# Patient Record
Sex: FEMALE | Race: WHITE | NOT HISPANIC OR LATINO | Employment: UNEMPLOYED | URBAN - METROPOLITAN AREA
[De-identification: names, ages, dates, MRNs, and addresses within clinical notes are randomized per-mention and may not be internally consistent; named-entity substitution may affect disease eponyms.]

---

## 2017-08-08 PROBLEM — O99.320 PREGNANCY COMPLICATED BY SUBUTEX MAINTENANCE, ANTEPARTUM: Status: ACTIVE | Noted: 2017-08-08

## 2017-08-08 PROBLEM — F11.20 PREGNANCY COMPLICATED BY SUBUTEX MAINTENANCE, ANTEPARTUM: Status: ACTIVE | Noted: 2017-08-08

## 2017-12-01 PROBLEM — Z98.891 STATUS POST PRIMARY LOW TRANSVERSE CESAREAN SECTION: Status: ACTIVE | Noted: 2017-12-01

## 2018-05-16 ENCOUNTER — TELEPHONE (OUTPATIENT)
Dept: FAMILY MEDICINE | Facility: CLINIC | Age: 33
End: 2018-05-16

## 2018-05-16 NOTE — TELEPHONE ENCOUNTER
----- Message from Jordin Ceballos sent at 2018  3:27 PM CDT -----  Contact: Patient  Paulette Bryant  MRN: [id number]  : 1985  PCP: Lakshmi Lange  Home Phone      [phone]  Work Phone      Not on file.  Mobile          [phone]      MESSAGE: new patient - has been seeing Dr Cedrick Mchugh @ [facility] MD for Subutex for a little over a yr -- she has medicaid & he does not accept her insurance -- she can not afford medication @ over $200 -- will you take as a patient -- please advise    Call [phone]    PCP: Anisa

## 2018-05-18 ENCOUNTER — TELEPHONE (OUTPATIENT)
Dept: FAMILY MEDICINE | Facility: CLINIC | Age: 33
End: 2018-05-18

## 2018-05-18 NOTE — TELEPHONE ENCOUNTER
----- Message from Jordin Ceballos sent at 2018 12:19 PM CDT -----  Contact: Patient  Paulette Bryant  MRN: [id number]  : 1985  PCP: Lakshmi Lange  Home Phone      [phone]  Work Phone      Not on file.  Mobile          [phone]      MESSAGE: called earlier in the week Re: becoming new Suboxone Program - was seeing Dr Mchugh (see previous message) --no response -- please check status & advise    Call [phone]    PCP: Anisa

## 2018-05-22 NOTE — TELEPHONE ENCOUNTER
Spoke with pt, appt scheduled for 5/25/18 at 2:45.  Informed pt to bring ID, medicaid card, all rx in prescribed bottle. Pt voiced verbal understanding

## 2018-05-25 ENCOUNTER — OFFICE VISIT (OUTPATIENT)
Dept: FAMILY MEDICINE | Facility: CLINIC | Age: 33
End: 2018-05-25
Payer: MEDICAID

## 2018-05-25 ENCOUNTER — TELEPHONE (OUTPATIENT)
Dept: FAMILY MEDICINE | Facility: CLINIC | Age: 33
End: 2018-05-25

## 2018-05-25 VITALS
BODY MASS INDEX: 24.22 KG/M2 | RESPIRATION RATE: 18 BRPM | SYSTOLIC BLOOD PRESSURE: 100 MMHG | WEIGHT: 145.38 LBS | DIASTOLIC BLOOD PRESSURE: 60 MMHG | HEIGHT: 65 IN | HEART RATE: 112 BPM

## 2018-05-25 DIAGNOSIS — F11.20 OPIOID USE DISORDER, MODERATE, DEPENDENCE: Primary | ICD-10-CM

## 2018-05-25 DIAGNOSIS — K59.03 CONSTIPATION DUE TO OPIOID THERAPY: ICD-10-CM

## 2018-05-25 DIAGNOSIS — T40.2X5A CONSTIPATION DUE TO OPIOID THERAPY: ICD-10-CM

## 2018-05-25 PROCEDURE — 99204 OFFICE O/P NEW MOD 45 MIN: CPT | Mod: S$PBB,,, | Performed by: FAMILY MEDICINE

## 2018-05-25 PROCEDURE — 99999 PR PBB SHADOW E&M-EST. PATIENT-LVL III: CPT | Mod: PBBFAC,,, | Performed by: FAMILY MEDICINE

## 2018-05-25 PROCEDURE — 99213 OFFICE O/P EST LOW 20 MIN: CPT | Mod: PBBFAC | Performed by: FAMILY MEDICINE

## 2018-05-25 RX ORDER — MIRTAZAPINE 15 MG/1
TABLET, FILM COATED ORAL
Refills: 1 | COMMUNITY
Start: 2018-05-03 | End: 2019-03-20

## 2018-05-25 RX ORDER — ALPRAZOLAM 1 MG/1
1 TABLET ORAL 3 TIMES DAILY PRN
Refills: 0 | COMMUNITY
Start: 2018-04-20 | End: 2018-05-25

## 2018-05-25 RX ORDER — ONDANSETRON 8 MG/1
TABLET, ORALLY DISINTEGRATING ORAL
COMMUNITY
End: 2018-05-25

## 2018-05-25 RX ORDER — ALPRAZOLAM 0.5 MG/1
TABLET ORAL
Refills: 0 | COMMUNITY
Start: 2018-05-13 | End: 2019-03-20

## 2018-05-25 RX ORDER — LUBIPROSTONE 24 UG/1
24 CAPSULE ORAL 2 TIMES DAILY WITH MEALS
Qty: 60 CAPSULE | Refills: 5 | Status: SHIPPED | OUTPATIENT
Start: 2018-05-25 | End: 2019-03-20

## 2018-05-25 RX ORDER — BUPRENORPHINE AND NALOXONE 8; 2 MG/1; MG/1
2 FILM, SOLUBLE BUCCAL; SUBLINGUAL DAILY
Qty: 60 EACH | Refills: 0 | Status: SHIPPED | OUTPATIENT
Start: 2018-05-25 | End: 2018-06-19

## 2018-05-25 NOTE — PROGRESS NOTES
Subjective:       Patient ID: Paulette Bryant is a 33 y.o. female.    Chief Complaint: Consult (suboxone contract)    Patient here for opioid use disorder.  She became addicted to opiates 5 years ago after a root canal. She escalated her opiate use to the point that she was snorting oxycodone.  She started sebutex last year after she became pregnant.  She has been doing well.  She is going to [facility] and counseling.  Patient sees psych NP for Xanax.  She has a 5 month old and a 16 year old child.      Review of Systems   Constitutional: Negative for activity change, chills, fatigue, fever and unexpected weight change.   HENT: Negative for sore throat and trouble swallowing.    Respiratory: Negative for cough, chest tightness and shortness of breath.    Cardiovascular: Negative for chest pain and leg swelling.   Gastrointestinal: Positive for constipation. Negative for abdominal pain.   Endocrine: Negative for cold intolerance and heat intolerance.   Genitourinary: Negative for difficulty urinating.   Musculoskeletal: Negative for back pain and joint swelling.   Skin: Negative for rash.   Neurological: Negative for numbness.   Hematological: Negative for adenopathy.   Psychiatric/Behavioral: Negative for decreased concentration.       Objective:      Vitals:    05/25/18 1504   BP: 100/60   Pulse: (!) 112   Resp: 18     Physical Exam   Constitutional: She is oriented to person, place, and time. She appears well-developed and well-nourished.   Cardiovascular: Normal rate, regular rhythm, normal heart sounds and intact distal pulses.    No murmur heard.  Pulmonary/Chest: Effort normal and breath sounds normal.   Neurological: She is alert and oriented to person, place, and time. No cranial nerve deficit.   Psychiatric: Her speech is normal and behavior is normal. Judgment and thought content normal. Cognition and memory are normal. She exhibits a depressed mood.       Assessment:       1. Opioid use disorder, moderate,  dependence    2. Constipation due to opioid therapy        Plan:   Paulette was seen today for consult.    Diagnoses and all orders for this visit:    Opioid use disorder, moderate, dependence  -     buprenorphine 8 mg Film; Place 2 packets (2 each total) under the tongue once daily. THALIA # (Suboxone) [id number].  She will be changed to Suboxone as soon as I can get it authorized.    1.  A prescription for Sebutex 8 mg sublingual will be given  2.  Patient is encouraged to continue [facility] TADEC.  3.  Patient will return to clinic in one month  4.  A urinary drug screen will be done as needed.    5.  Patient encouraged to avoid places and friends that are associated with past drug  use.  6.  15 minutes was spent with patient.  At that time was counseling the patient about dependence, anxiety issues, and relationships    Constipation due to opioid therapy  -     lubiprostone (AMITIZA) 24 MCG Cap; Take 1 capsule (24 mcg total) by mouth 2 (two) times daily with meals.  Increase fiber  Continue Miralax, colace    RTC in 1 month

## 2018-05-25 NOTE — TELEPHONE ENCOUNTER
----- Message from Kathe Degroot sent at 2018  4:01 PM CDT -----  Contact: Pelon/Rod Dougherty  Paulette Bryant  MRN: [id number]  : 1985  PCP: Yonny Interiano  Home Phone      [phone]  Work Phone      Not on file.  Mobile          [phone]    MESSAGE:   Would like to speak to nurse about getting an RX substitution.  Patient was prescribed RX buprenorphine-naloxone (SUBOXONE) 8-2 mg Film, but was previously taking RX Subutex and insurance has approved Subutex already.  Would like to continue Subutex instead. Please call.  (Patient is waiting at pharmacy)    Ph[facility]acy: [facility]    Phone: [phone]

## 2018-05-25 NOTE — TELEPHONE ENCOUNTER
Spoke with Dr Blancas - Rx changed to Subutex #60 2packets sl daily.  Spoke with Pelon at [facility]

## 2018-05-25 NOTE — TELEPHONE ENCOUNTER
Pt seen today, was previously taking RX Subutex and insurance has approved Subutex already. Would like to continue Subutex instead of awaiting PA for Suboxone. Please advise, thank you.    Pharmacy: [facility].

## 2018-05-25 NOTE — PATIENT INSTRUCTIONS
Constipation (Adult)  Constipation means that you have bowel movements that are less frequent than usual. Stools often become very hard and difficult to pass.  Constipation is very common. At some point in life it affects almost everyone. Since everyone's bowel habits are different, what is constipation to one person may not be to another. Your healthcare provider may do tests to diagnose constipation. It depends on what he or she finds when evaluating you.    Symptoms of constipation include:  · Abdominal pain  · Bloating  · Vomiting  · Painful bowel movements  · Itching, swelling, bleeding, or pain around the anus  Causes  Constipation can have many causes. These include:  · Diet low in fiber  · Too much dairy  · Not drinking enough liquids  · Lack of exercise or physical activity. This is especially true for older adults.  · Changes in lifestyle or daily routine, including pregnancy, aging, work, and travel  · Frequent use or misuse of laxatives  · Ignoring the urge to have a bowel movement or delaying it until later  · Medicines, such as certain prescription pain medicines, iron supplements, antacids, certain antidepressants, and calcium supplements  · Diseases like irritable bowel syndrome, bowel obstructions, stroke, diabetes, thyroid disease, Parkinson disease, hemorrhoids, and colon cancer  Complications  Potential complications of constipation can include:  · Hemorrhoids  · Rectal bleeding from hemorrhoids or anal fissures (skin tears)  · Hernias  · Dependency on laxatives  · Chronic constipation  · Fecal impaction  · Bowel obstruction or perforation  Home care  All treatment should be done after talking with your healthcare provider. This is especially true if you have another medical problems, are taking prescription medicines, or are an older adult. Treatment most often involves lifestyle changes. You may also need medicines. Your healthcare provider will tell you which will work best for you. Follow  the advice below to help avoid this problem in the future.  Lifestyle changes  These lifestyle changes can help prevent constipation:  · Diet. Eat a high-fiber diet, with fresh fruit and vegetables, and reduce dairy intake, meats, and processed foods  · Fluids. It's important to get enough fluids each day. Drink plenty of water when you eat more fiber. If you are on diet that limits the amount of fluid you can have, talk about this with your healthcare provider.  · Regular exercise. Check with your healthcare provider first.  Medications  Take any medicines as directed. Some laxatives are safe to use only every now and then. Others can be taken on a regular basis. Talk with your doctor or pharmacist if you have questions.  Prescription pain medicines can cause constipation. If you are taking this kind of medicine, ask your healthcare provider if you should also take a stool softener.  Medicines you may take to treat constipation include:  · Fiber supplements  · Stool softeners  · Laxatives  · Enemas  · Rectal suppositories  Follow-up care  Follow up with your healthcare provider if symptoms don't get better in the next few days. You may need to have more tests or see a specialist.  Call 911  Call 911 if any of these occur:  · Trouble breathing  · Stiff, rigid abdomen that is severely painful to touch  · Confusion  · Fainting or loss of consciousness  · Rapid heart rate  · Chest pain  When to seek medical advice  Call your healthcare provider right away if any of these occur:  · Fever over 100.4°F (38°C)  · Failure to resume normal bowel movements  · Pain in your abdomen or back gets worse  · Nausea or vomiting  · Swelling in your abdomen  · Blood in the stool  · Black, tarry stool  · Involuntary weight loss  · Weakness  Date Last Reviewed: 12/30/2015  © 1733-6890 [employer]. [address] [place] [address], [place] [address]. All rights reserved. This information is not intended as a substitute for  professional medical care. Always follow your healthcare professional's instructions.

## 2018-07-13 ENCOUNTER — OFFICE VISIT (OUTPATIENT)
Dept: FAMILY MEDICINE | Facility: CLINIC | Age: 33
End: 2018-07-13
Payer: MEDICAID

## 2018-07-13 VITALS
HEART RATE: 124 BPM | SYSTOLIC BLOOD PRESSURE: 114 MMHG | BODY MASS INDEX: 22.69 KG/M2 | WEIGHT: 136.19 LBS | DIASTOLIC BLOOD PRESSURE: 64 MMHG | RESPIRATION RATE: 18 BRPM | HEIGHT: 65 IN

## 2018-07-13 DIAGNOSIS — T40.2X5A CONSTIPATION DUE TO OPIOID THERAPY: ICD-10-CM

## 2018-07-13 DIAGNOSIS — K59.03 CONSTIPATION DUE TO OPIOID THERAPY: ICD-10-CM

## 2018-07-13 DIAGNOSIS — F11.20 OPIOID USE DISORDER, SEVERE, DEPENDENCE: Primary | ICD-10-CM

## 2018-07-13 PROCEDURE — 99999 PR PBB SHADOW E&M-EST. PATIENT-LVL III: CPT | Mod: PBBFAC,,, | Performed by: FAMILY MEDICINE

## 2018-07-13 PROCEDURE — 99213 OFFICE O/P EST LOW 20 MIN: CPT | Mod: PBBFAC | Performed by: FAMILY MEDICINE

## 2018-07-13 PROCEDURE — 99214 OFFICE O/P EST MOD 30 MIN: CPT | Mod: S$PBB,,, | Performed by: FAMILY MEDICINE

## 2018-07-13 RX ORDER — BUPRENORPHINE HYDROCHLORIDE 8 MG/1
16 TABLET SUBLINGUAL DAILY
Qty: 60 TABLET | Refills: 0 | Status: SHIPPED | OUTPATIENT
Start: 2018-07-13 | End: 2019-03-20 | Stop reason: SDUPTHER

## 2018-07-13 RX ORDER — SERTRALINE HYDROCHLORIDE 50 MG/1
50 TABLET, FILM COATED ORAL DAILY
COMMUNITY
End: 2019-03-20

## 2018-07-13 NOTE — PROGRESS NOTES
Subjective:       Patient ID: Paulette Bryant is a 33 y.o. female.    Chief Complaint: Follow-up (4 week follow up)    Patient here for opioid use disorder.  She became addicted to opiates 5 years ago after a root canal. She escalated her opiate use to the point that she was snorting oxycodone.  She started sebutex last year after she became pregnant.  She has been doing well.  [facility]  Patient sees psych NP for Xanax.  She has a 5 month old and a 16 year old child.  She was stable on Sebutex 16 mg daily.  She stopped it 10 days ago and now in full blown withdrawal.  She began taking oxycodone to relieve withdraw symptoms.  She is very nauseated.  She suffers with opioid induced constipation.  Amitiza has been helpful.      Review of Systems   Constitutional: Negative for activity change, chills, fatigue, fever and unexpected weight change.   HENT: Negative for sore throat and trouble swallowing.    Respiratory: Negative for cough, chest tightness and shortness of breath.    Cardiovascular: Negative for chest pain and leg swelling.   Gastrointestinal: Positive for constipation. Negative for abdominal pain.   Endocrine: Negative for cold intolerance and heat intolerance.   Genitourinary: Negative for difficulty urinating.   Musculoskeletal: Negative for back pain and joint swelling.   Skin: Negative for rash.   Neurological: Negative for numbness.   Hematological: Negative for adenopathy.   Psychiatric/Behavioral: Negative for decreased concentration.       Objective:      Vitals:    07/13/18 1407   BP: 114/64   Pulse: (!) 124   Resp: 18     Physical Exam   Constitutional: She is oriented to person, place, and time. She appears well-developed and well-nourished. She appears distressed.   She is an obvious opioid withdrawal   Cardiovascular: Regular rhythm, S1 normal, S2 normal, normal heart sounds and intact distal pulses.  Tachycardia present.  Exam reveals no gallop and no friction  rub.    No murmur heard.  Pulmonary/Chest: Effort normal and breath sounds normal.   Neurological: She is alert and oriented to person, place, and time. No cranial nerve deficit.   Skin: She is diaphoretic.   Psychiatric: Her speech is normal and behavior is normal. Judgment and thought content normal. Cognition and memory are normal. She exhibits a depressed mood.         Assessment:       1. Constipation due to opioid therapy        Plan:   Paulette was seen today for consult.    Diagnoses and all orders for this visit:    Opioid use disorder, now in withdrawal  1.  Restart buprenorphine 8 mg Film; Place 2 packets (2 each total) under the tongue once daily.   2.  Patient is encouraged to continue [place] TADEC.  3.  Patient will return to clinic in one month  4.  A urinary drug screen will be done as needed.    5.  Patient encouraged to avoid places and friends that are associated with past drug  use.  6.  15 minutes was spent with patient.  At that time was counseling the patient about dependence, anxiety issues, and relationships     I will re-evaluate patient in 2 weeks and will start weaning her very slowly.    Constipation due to opioid therapy  AMITIZA 24 MCG Cap; Take 1 capsule (24 mcg total) by mouth 2 (two) times daily with meals.  Increase fiber  Continue Miralax, colace    RTC in 2 weeks

## 2018-07-25 ENCOUNTER — TELEPHONE (OUTPATIENT)
Dept: FAMILY MEDICINE | Facility: CLINIC | Age: 33
End: 2018-07-25

## 2018-07-25 NOTE — TELEPHONE ENCOUNTER
----- Message from Jordin Ceballos sent at 2018  2:54 PM CDT -----  Contact: Patient  Paulette Bryant  MRN: [id number]  : 1985  PCP: Yonny Interiano  Home Phone      [phone]  Work Phone      Not on file.  Mobile          Not on file.      MESSAGE: asking to speak with Dr Blancas's nurse Re: appt scheduled for Friday    Call [phone]    PCP: Jaydon

## 2019-03-20 ENCOUNTER — OFFICE VISIT (OUTPATIENT)
Dept: FAMILY MEDICINE | Facility: CLINIC | Age: 34
End: 2019-03-20
Payer: MEDICAID

## 2019-03-20 VITALS
BODY MASS INDEX: 19.72 KG/M2 | RESPIRATION RATE: 18 BRPM | HEART RATE: 80 BPM | SYSTOLIC BLOOD PRESSURE: 88 MMHG | DIASTOLIC BLOOD PRESSURE: 56 MMHG | WEIGHT: 118.38 LBS | HEIGHT: 65 IN

## 2019-03-20 DIAGNOSIS — F11.20 OPIOID USE DISORDER, SEVERE, DEPENDENCE: ICD-10-CM

## 2019-03-20 PROCEDURE — 99214 PR OFFICE/OUTPT VISIT, EST, LEVL IV, 30-39 MIN: ICD-10-PCS | Mod: S$PBB,,, | Performed by: FAMILY MEDICINE

## 2019-03-20 PROCEDURE — 99999 PR PBB SHADOW E&M-EST. PATIENT-LVL IV: ICD-10-PCS | Mod: PBBFAC,,, | Performed by: FAMILY MEDICINE

## 2019-03-20 PROCEDURE — 99214 OFFICE O/P EST MOD 30 MIN: CPT | Mod: PBBFAC | Performed by: FAMILY MEDICINE

## 2019-03-20 PROCEDURE — 99999 PR PBB SHADOW E&M-EST. PATIENT-LVL IV: CPT | Mod: PBBFAC,,, | Performed by: FAMILY MEDICINE

## 2019-03-20 PROCEDURE — 99214 OFFICE O/P EST MOD 30 MIN: CPT | Mod: S$PBB,,, | Performed by: FAMILY MEDICINE

## 2019-03-20 RX ORDER — PREDNISONE 20 MG/1
20 TABLET ORAL DAILY
Refills: 0 | COMMUNITY
Start: 2019-03-15 | End: 2019-06-26 | Stop reason: ALTCHOICE

## 2019-03-20 RX ORDER — BUPRENORPHINE HYDROCHLORIDE 8 MG/1
16 TABLET SUBLINGUAL DAILY
Qty: 60 TABLET | Refills: 0 | Status: SHIPPED | OUTPATIENT
Start: 2019-03-20 | End: 2019-04-15 | Stop reason: SDUPTHER

## 2019-03-20 RX ORDER — AMOXICILLIN 875 MG/1
TABLET, FILM COATED ORAL
Refills: 0 | COMMUNITY
Start: 2019-03-15 | End: 2019-06-26 | Stop reason: ALTCHOICE

## 2019-03-20 NOTE — PROGRESS NOTES
Subjective:       Patient ID: Paulette Bryant is a 33 y.o. female.    Chief Complaint: IV Medication (Subutex)    Patient in middle of divorce.  Now using 3 percocet per day.  She tried to stop but developed severe withdrawals.  She borrowed a friend's Subutex which was very helpful.  She feels like she needs to get back on Subutex.  She is currently seeing a counselor for her psych issues.  She became addicted to opiates 5 years ago after a root canal. She escalated her opiate use to the point that she was snorting oxycodone.  She started sebutex last year after she became pregnant.  She has been doing well.  She is going to [facility] and counseling.  Patient sees psych NP for Xanax.  She has a 5 month old and a 16 year old child.    She suffers with opioid induced constipation.  Amitiza has been helpful.      Review of Systems   Constitutional: Negative for activity change, chills, fatigue, fever and unexpected weight change.   HENT: Negative for sore throat and trouble swallowing.    Respiratory: Negative for cough, chest tightness and shortness of breath.    Cardiovascular: Negative for chest pain and leg swelling.   Gastrointestinal: Positive for constipation. Negative for abdominal pain.   Endocrine: Negative for cold intolerance and heat intolerance.   Genitourinary: Negative for difficulty urinating.   Musculoskeletal: Negative for back pain and joint swelling.   Skin: Negative for rash.   Neurological: Negative for numbness.   Hematological: Negative for adenopathy.   Psychiatric/Behavioral: Negative for decreased concentration.       Objective:      Vitals:    03/20/19 0934   BP: (!) 88/56   Pulse: 80   Resp: 18     Physical Exam   Constitutional: She is oriented to person, place, and time. She appears well-developed and well-nourished. She appears distressed.   She is an obvious opioid withdrawal   Cardiovascular: Regular rhythm, S1 normal, S2 normal, normal heart sounds and intact distal pulses. Tachycardia  present. Exam reveals no gallop and no friction rub.   No murmur heard.  Pulmonary/Chest: Effort normal and breath sounds normal.   Neurological: She is alert and oriented to person, place, and time. No cranial nerve deficit.   Skin: She is diaphoretic.   Psychiatric: Her speech is normal and behavior is normal. Judgment and thought content normal. Cognition and memory are normal. She exhibits a depressed mood.         Assessment:       1. Opioid use disorder, severe, dependence        Plan:   Paulette was seen today for consult.    Diagnoses and all orders for this visit:    Opioid use disorder, now in withdrawal  1.  Restart buprenorphine 8 mg Film; Place 2 packets (2 each total) under the tongue once daily.   2.  Patient is encouraged to continue [facility] TADEC.  3.  Patient will return to clinic in one month  4.  A urinary drug screen will be done as needed.    5.  Patient encouraged to avoid places and friends that are associated with past drug  use.  6.  15 minutes was spent with patient.  At that time was counseling the patient about dependence, anxiety issues, and relationships    Constipation due to opioid therapy  AMITIZA 24 MCG Cap; Take 1 capsule (24 mcg total) by mouth 2 (two) times daily with meals.  Increase fiber  Continue Miralax, colace    RTC in 2 weeks

## 2019-04-15 ENCOUNTER — TELEPHONE (OUTPATIENT)
Dept: FAMILY MEDICINE | Facility: CLINIC | Age: 34
End: 2019-04-15

## 2019-04-15 DIAGNOSIS — F11.20 OPIOID USE DISORDER, SEVERE, DEPENDENCE: ICD-10-CM

## 2019-04-15 RX ORDER — BUPRENORPHINE HYDROCHLORIDE 8 MG/1
16 TABLET SUBLINGUAL DAILY
Qty: 60 TABLET | Refills: 0 | Status: SHIPPED | OUTPATIENT
Start: 2019-04-15 | End: 2019-06-26 | Stop reason: SDUPTHER

## 2019-04-15 NOTE — TELEPHONE ENCOUNTER
"LOV and rx last printed on 3/20/19  Spoke with April at [facility], medication buprenorphine HCl (SUBUTEX) 8 mg Subl.   The patient states she is 12 weeks pregnant. Not sure if this medication and dosage is safe for pregnancy.     Pt sees Dr Lawrence Greene OB/GYN: he may be calling you to see if this dose is ok.         NASIR [place]   "race">White "sex">Female  "cell_block"    Date">04/14/2019  "held_for_agency">[facility] POLICE       CHARGE: 40:967 C (1) - POSSESSION OF OXYCODONE; Arrest Date 04/14/2019; [facility] - [facility]; Set By JUDGE JOHNATHAN BERMUDEZ;<BR>ONSITE     CHARGE: 40:966 - POSSESSION OF MARIJUANA 1ST  OFFENSE (MISD); Arrest Date 04/14/2019; [facility] - [facility]; Set By JUDGE JOHNATHAN BERMUDEZ;<BR>ONSITE     CHARGE: 40:1023 - PROHIBITED ACTS/ POSSESSION OF DRUG PARAPHERNALIA; Arrest Date 04/14/2019; [facility] - [facility], $5000.00; Set By JUDGE JOHNATHAN BERMUDEZ;<BR>ONSITE     CHARGE: 14:98.1 - DWI 1ST; Arrest Date 04/14/2019; [facility] - [facility], $500.00; Set By JUDGE JOHNATHAN BERMUDEZ;  "

## 2019-04-15 NOTE — TELEPHONE ENCOUNTER
Spoke with April at [facility] [place], she said pt maybe getting out today- if not April will call back

## 2019-04-15 NOTE — TELEPHONE ENCOUNTER
Needs to stay at current dose    Opioid use disorder, severe, dependence  -     buprenorphine HCl (SUBUTEX) 8 mg Subl; Place 2 tablets (16 mg total) under the tongue once daily. THALIA # (Suboxone) [id number]  Dispense: 60 tablet; Refill: 0

## 2019-04-15 NOTE — TELEPHONE ENCOUNTER
----- Message from Roxy Robledo sent at 4/15/2019  9:34 AM CDT -----  Contact: April - stella Bryant  MRN: [id number]  : 1985  PCP: Yonny Interiano  Home Phone      [phone]  Work Phone      Not on file.  Mobile          [phone]      MESSAGE:    April needs to speak to a nurse about the patients medication buprenorphine HCl (SUBUTEX) 8 mg Subl. The patient states she is 12 weeks pregnant. Not sure if this medication and dosage is safe for pregnancy.     [phone]

## 2019-06-24 ENCOUNTER — TELEPHONE (OUTPATIENT)
Dept: FAMILY MEDICINE | Facility: CLINIC | Age: 34
End: 2019-06-24

## 2019-06-24 NOTE — TELEPHONE ENCOUNTER
----- Message from Cynthia Cantu sent at 2019  9:41 AM CDT -----  Contact: self  Paulette Dupont  MRN: [id number]  : 1985  PCP: Yonny Interiano  Home Phone      [phone]  Work Phone      [phone]  Mobile          [phone]      MESSAGE:   Pt requests a sooner appointment than the  can schedule.  Does patient feel like they need to be seen today:  yes  What is the nature of the appointment:  Discuss medication   What visit type:  est  Did you check other providers/department schedules for availability:   Only yashs yonny  Comments:     Phone:  [phone]

## 2019-06-26 ENCOUNTER — OFFICE VISIT (OUTPATIENT)
Dept: FAMILY MEDICINE | Facility: CLINIC | Age: 34
End: 2019-06-26
Payer: MEDICAID

## 2019-06-26 VITALS
WEIGHT: 122 LBS | BODY MASS INDEX: 20.33 KG/M2 | DIASTOLIC BLOOD PRESSURE: 80 MMHG | SYSTOLIC BLOOD PRESSURE: 122 MMHG | HEIGHT: 65 IN | RESPIRATION RATE: 16 BRPM | HEART RATE: 80 BPM

## 2019-06-26 DIAGNOSIS — F11.20 OPIOID USE DISORDER, SEVERE, DEPENDENCE: ICD-10-CM

## 2019-06-26 PROCEDURE — 99214 PR OFFICE/OUTPT VISIT, EST, LEVL IV, 30-39 MIN: ICD-10-PCS | Mod: S$PBB,,, | Performed by: FAMILY MEDICINE

## 2019-06-26 PROCEDURE — 99999 PR PBB SHADOW E&M-EST. PATIENT-LVL III: ICD-10-PCS | Mod: PBBFAC,,, | Performed by: FAMILY MEDICINE

## 2019-06-26 PROCEDURE — 99999 PR PBB SHADOW E&M-EST. PATIENT-LVL III: CPT | Mod: PBBFAC,,, | Performed by: FAMILY MEDICINE

## 2019-06-26 PROCEDURE — 99213 OFFICE O/P EST LOW 20 MIN: CPT | Mod: PBBFAC | Performed by: FAMILY MEDICINE

## 2019-06-26 PROCEDURE — 99214 OFFICE O/P EST MOD 30 MIN: CPT | Mod: S$PBB,,, | Performed by: FAMILY MEDICINE

## 2019-06-26 RX ORDER — BUPRENORPHINE HYDROCHLORIDE 8 MG/1
16 TABLET SUBLINGUAL DAILY
Qty: 60 TABLET | Refills: 0 | Status: SHIPPED | OUTPATIENT
Start: 2019-06-26 | End: 2019-07-26

## 2019-06-26 NOTE — PROGRESS NOTES
Subjective:       Patient ID: Paulette Dupont is a 34 y.o. female.    Chief Complaint: discuss medications    Patient in middle of divorce.  Now using 3 percocet per day.  She tried to stop but developed severe withdrawals.  She borrowed a friend's Subutex which was very helpful.  She feels like she needs to get back on Subutex.  She is currently seeing a counselor for her psych issues.  She became addicted to opiates 5 years ago after a root canal. She escalated her opiate use to the point that she was snorting oxycodone.  She started sebutex last year after she became pregnant.  She has been doing well.  She is going to [facility] and counseling.  Patient sees psych NP for Xanax.  She has a 5 month old and a 16 year old child.    She suffers with opioid induced constipation.  Amitiza has been helpful.    Review of Systems   Constitutional: Negative for activity change, chills, fatigue, fever and unexpected weight change.   HENT: Negative for sore throat and trouble swallowing.    Respiratory: Negative for cough, chest tightness and shortness of breath.    Cardiovascular: Negative for chest pain and leg swelling.   Gastrointestinal: Positive for constipation. Negative for abdominal pain.   Endocrine: Negative for cold intolerance and heat intolerance.   Genitourinary: Negative for difficulty urinating.   Musculoskeletal: Negative for back pain and joint swelling.   Skin: Negative for rash.   Neurological: Negative for numbness.   Hematological: Negative for adenopathy.   Psychiatric/Behavioral: Negative for decreased concentration.       Objective:      Vitals:    06/26/19 1618   BP: 122/80   Pulse: 80   Resp: 16     Physical Exam   Constitutional: She is oriented to person, place, and time. She appears well-developed and well-nourished. She appears distressed.   She is an obvious opioid withdrawal   Cardiovascular: Regular rhythm, S1 normal, S2 normal, normal heart sounds and intact distal pulses. Tachycardia present.  Exam reveals no gallop and no friction rub.   No murmur heard.  Pulmonary/Chest: Effort normal and breath sounds normal.   Neurological: She is alert and oriented to person, place, and time. No cranial nerve deficit.   Skin: She is diaphoretic.   Psychiatric: Her speech is normal and behavior is normal. Judgment and thought content normal. Cognition and memory are normal. She exhibits a depressed mood.         Assessment:       1. Opioid use disorder, severe, dependence        Plan:   Paulette was seen today for consult.    Diagnoses and all orders for this visit:    Opioid use disorder, now in withdrawal  Opioid use disorder, severe, dependence  -     buprenorphine HCl (SUBUTEX) 8 mg Subl; Place 2 tablets (16 mg total) under the tongue once daily. THALIA # (Suboxone) [id number]  Dispense: 60 tablet; Refill: 0    1.  Restart buprenorphine 8 mg Film; Place 2 packets (2 each total) under the tongue once daily.   2.  Patient is encouraged to continue [facility] TADEC.  3.  Patient will return to clinic in one month  4.  A urinary drug screen will be done as needed.    5.  Patient encouraged to avoid places and friends that are associated with past drug  use.  6.  15 minutes was spent with patient.  At that time was counseling the patient about dependence, anxiety issues, and relationships    Constipation due to opioid therapy  AMITIZA 24 MCG Cap; Take 1 capsule (24 mcg total) by mouth 2 (two) times daily with meals.  Increase fiber  Continue Miralax, colace    RTC in 2 weeks

## 2019-08-19 RX ORDER — BUPRENORPHINE HYDROCHLORIDE 8 MG/1
16 TABLET SUBLINGUAL DAILY
COMMUNITY
End: 2019-08-19 | Stop reason: SDUPTHER

## 2019-08-19 NOTE — TELEPHONE ENCOUNTER
----- Message from Cynthia Cantu sent at 2019 12:59 PM CDT -----  Contact: self  Paulette Dupont  MRN: [id number]  : 1985  PCP: Yonny Interiano  Home Phone      [phone]  Work Phone      [phone]  Mobile          [phone]      MESSAGE:   Pt requesting refill or new Rx.   Is this a refill or new RX:  refill  RX name and strength: buprenorphine HCl (SUBUTEX) 8 mg Subl  Last office visit: 19  Is this a 30-day or 90-day RX:  30  Pharmacy name and location:  [facility]  Comments:      Phone:  [phone]

## 2019-08-19 NOTE — TELEPHONE ENCOUNTER
LOV 6/26/19, rtc 2 weeks- pt no showed for appt    subutex last printed on 6/26/19    She has appt on 8/21/19, she needs enough until appt  Thank you

## 2019-08-21 RX ORDER — BUPRENORPHINE HYDROCHLORIDE 8 MG/1
16 TABLET SUBLINGUAL DAILY
Qty: 4 TABLET | Refills: 0 | Status: SHIPPED | OUTPATIENT
Start: 2019-08-21 | End: 2019-10-25 | Stop reason: DRUGHIGH

## 2019-10-17 PROBLEM — O34.219 VBAC (VAGINAL BIRTH AFTER CESAREAN): Status: ACTIVE | Noted: 2019-10-17

## 2019-10-19 PROBLEM — O34.219 VBAC (VAGINAL BIRTH AFTER CESAREAN): Status: RESOLVED | Noted: 2019-10-17 | Resolved: 2019-10-19

## 2019-10-25 ENCOUNTER — OFFICE VISIT (OUTPATIENT)
Dept: FAMILY MEDICINE | Facility: CLINIC | Age: 34
End: 2019-10-25
Payer: COMMERCIAL

## 2019-10-25 VITALS
RESPIRATION RATE: 18 BRPM | SYSTOLIC BLOOD PRESSURE: 138 MMHG | HEIGHT: 65 IN | DIASTOLIC BLOOD PRESSURE: 92 MMHG | HEART RATE: 86 BPM | WEIGHT: 126 LBS | BODY MASS INDEX: 20.99 KG/M2

## 2019-10-25 DIAGNOSIS — F11.20 OPIOID USE DISORDER, SEVERE, DEPENDENCE: Primary | ICD-10-CM

## 2019-10-25 PROCEDURE — 99214 OFFICE O/P EST MOD 30 MIN: CPT | Mod: S$GLB,,, | Performed by: FAMILY MEDICINE

## 2019-10-25 PROCEDURE — 99999 PR PBB SHADOW E&M-EST. PATIENT-LVL III: CPT | Mod: PBBFAC,,, | Performed by: FAMILY MEDICINE

## 2019-10-25 PROCEDURE — 99214 PR OFFICE/OUTPT VISIT, EST, LEVL IV, 30-39 MIN: ICD-10-PCS | Mod: S$GLB,,, | Performed by: FAMILY MEDICINE

## 2019-10-25 PROCEDURE — 3008F PR BODY MASS INDEX (BMI) DOCUMENTED: ICD-10-PCS | Mod: CPTII,S$GLB,, | Performed by: FAMILY MEDICINE

## 2019-10-25 PROCEDURE — 99999 PR PBB SHADOW E&M-EST. PATIENT-LVL III: ICD-10-PCS | Mod: PBBFAC,,, | Performed by: FAMILY MEDICINE

## 2019-10-25 PROCEDURE — 3008F BODY MASS INDEX DOCD: CPT | Mod: CPTII,S$GLB,, | Performed by: FAMILY MEDICINE

## 2019-10-25 RX ORDER — BUPRENORPHINE AND NALOXONE 8; 2 MG/1; MG/1
2 FILM, SOLUBLE BUCCAL; SUBLINGUAL DAILY
Qty: 60 EACH | Refills: 0 | Status: SHIPPED | OUTPATIENT
Start: 2019-10-25 | End: 2019-12-11 | Stop reason: SDUPTHER

## 2019-10-25 RX ORDER — BUPRENORPHINE HYDROCHLORIDE 8 MG/1
16 TABLET SUBLINGUAL DAILY
Qty: 4 TABLET | Refills: 0 | Status: CANCELLED | OUTPATIENT
Start: 2019-10-25

## 2019-10-25 NOTE — PROGRESS NOTES
Subjective:       Patient ID: Paulette Dupont is a 34 y.o. female.    Chief Complaint: Medication Refill    Patient in middle of divorce.  Now using 3 percocet per day.  She tried to stop but developed severe withdrawals.  She borrowed a friend's Subutex which was very helpful.  She feels like she needs to get back on Subutex.  She is currently seeing a counselor for her psych issues.  She became addicted to opiates 5 years ago after a root canal. She escalated her opiate use to the point that she was snorting oxycodone.  She started sebutex last year after she became pregnant.  She has been doing well.  She is going to [facility] and counseling.  Patient sees psych NP for Xanax.  She has a 5 month old and a 16 year old child.    She suffers with opioid induced constipation.  Amitiza has been helpful.    Medication Refill   Pertinent negatives include no abdominal pain, chest pain, chills, coughing, fatigue, fever, joint swelling, numbness, rash or sore throat.     Review of Systems   Constitutional: Negative for activity change, chills, fatigue, fever and unexpected weight change.   HENT: Negative for sore throat and trouble swallowing.    Respiratory: Negative for cough, chest tightness and shortness of breath.    Cardiovascular: Negative for chest pain and leg swelling.   Gastrointestinal: Positive for constipation. Negative for abdominal pain.   Endocrine: Negative for cold intolerance and heat intolerance.   Genitourinary: Negative for difficulty urinating.   Musculoskeletal: Negative for back pain and joint swelling.   Skin: Negative for rash.   Neurological: Negative for numbness.   Hematological: Negative for adenopathy.   Psychiatric/Behavioral: Negative for decreased concentration.       Objective:      Vitals:    10/25/19 1028   BP: (!) 138/92   Pulse: 86   Resp: 18     Physical Exam   Constitutional: She is oriented to person, place, and time. She appears well-developed and well-nourished. She appears  distressed.   She is an obvious opioid withdrawal   Cardiovascular: Regular rhythm, S1 normal, S2 normal, normal heart sounds and intact distal pulses. Tachycardia present. Exam reveals no gallop and no friction rub.   No murmur heard.  Pulmonary/Chest: Effort normal and breath sounds normal.   Neurological: She is alert and oriented to person, place, and time. No cranial nerve deficit.   Skin: She is diaphoretic.   Psychiatric: Her speech is normal and behavior is normal. Judgment and thought content normal. Cognition and memory are normal. She exhibits a depressed mood.         Assessment:       1. Opioid use disorder, severe, dependence        Plan:   Paulette was seen today for consult.    Diagnoses and all orders for this visit:    Opioid use disorder, now in withdrawal  Opioid use disorder, severe, dependence  -     buprenorphine-naloxone (SUBOXONE) 8-2 mg Film; Place 2 packets (2 each total) under the tongue once daily. THALIA # (Suboxone) [id number]  Dispense: 60 each; Refill: 0    Other orders  -     Cancel: buprenorphine HCl (SUBUTEX) 8 mg Subl; Place 2 tablets (16 mg total) under the tongue once daily.  Dispense: 4 tablet; Refill: 0    1.  Restart buprenorphine 8 mg Film; Place 2 packets (2 each total) under the tongue once daily.   2.  Patient is encouraged to continue [facility].  3.  Patient will return to clinic in one month  4.  A urinary drug screen will be done as needed.    5.  Patient encouraged to avoid places and friends that are associated with past drug  use.  6.  15 minutes was spent with patient.  At that time was counseling the patient about dependence, anxiety issues, and relationships    Constipation due to opioid therapy  AMITIZA 24 MCG Cap; Take 1 capsule (24 mcg total) by mouth 2 (two) times daily with meals.  Increase fiber  Continue Miralax, colace    RTC in 4 weeks

## 2019-12-11 ENCOUNTER — OFFICE VISIT (OUTPATIENT)
Dept: FAMILY MEDICINE | Facility: CLINIC | Age: 34
End: 2019-12-11
Payer: COMMERCIAL

## 2019-12-11 VITALS
HEART RATE: 112 BPM | HEIGHT: 65 IN | DIASTOLIC BLOOD PRESSURE: 60 MMHG | RESPIRATION RATE: 16 BRPM | WEIGHT: 119.06 LBS | SYSTOLIC BLOOD PRESSURE: 132 MMHG | BODY MASS INDEX: 19.83 KG/M2

## 2019-12-11 DIAGNOSIS — F11.20 OPIOID USE DISORDER, SEVERE, DEPENDENCE: ICD-10-CM

## 2019-12-11 PROCEDURE — 99999 PR PBB SHADOW E&M-EST. PATIENT-LVL III: CPT | Mod: PBBFAC,,, | Performed by: FAMILY MEDICINE

## 2019-12-11 PROCEDURE — 99999 PR PBB SHADOW E&M-EST. PATIENT-LVL III: ICD-10-PCS | Mod: PBBFAC,,, | Performed by: FAMILY MEDICINE

## 2019-12-11 PROCEDURE — 99213 PR OFFICE/OUTPT VISIT, EST, LEVL III, 20-29 MIN: ICD-10-PCS | Mod: S$GLB,,, | Performed by: FAMILY MEDICINE

## 2019-12-11 PROCEDURE — 3008F PR BODY MASS INDEX (BMI) DOCUMENTED: ICD-10-PCS | Mod: CPTII,S$GLB,, | Performed by: FAMILY MEDICINE

## 2019-12-11 PROCEDURE — 3008F BODY MASS INDEX DOCD: CPT | Mod: CPTII,S$GLB,, | Performed by: FAMILY MEDICINE

## 2019-12-11 PROCEDURE — 99213 OFFICE O/P EST LOW 20 MIN: CPT | Mod: S$GLB,,, | Performed by: FAMILY MEDICINE

## 2019-12-11 RX ORDER — CLONAZEPAM 2 MG/1
TABLET ORAL
Refills: 0 | COMMUNITY
Start: 2019-11-11

## 2019-12-11 RX ORDER — TRAZODONE HYDROCHLORIDE 50 MG/1
50 TABLET ORAL DAILY
Refills: 5 | COMMUNITY
Start: 2019-10-25

## 2019-12-11 RX ORDER — BUPRENORPHINE AND NALOXONE 8; 2 MG/1; MG/1
2 FILM, SOLUBLE BUCCAL; SUBLINGUAL DAILY
Qty: 60 EACH | Refills: 0 | Status: SHIPPED | OUTPATIENT
Start: 2019-12-11 | End: 2020-01-10

## 2019-12-11 NOTE — PROGRESS NOTES
Subjective:       Patient ID: Paulette Dupont is a 34 y.o. female.    Chief Complaint: Follow-up (check up, ran out of meds) and Back Pain (mid-back pain radiates to R shoulder)    Patient here for Suboxone.  She takes 2 per day.  She missed her appointment 2 weeks ago and ran out.  Since then she has been stretching her Suboxone.  She took meth as well as THC to deal with withdrawal symptoms.  It took a little bit of interviewing to get this patient to admit to this.  Patient was in the middle of divorce.  She was using 3 percocet per day.  She tried to stop but developed severe withdrawals.  She borrowed a friend's Subutex which was very helpful.  She feels like she needs to get back on Subutex.  She is currently seeing a counselor for her psych issues.  She became addicted to opiates 5 years ago after a root canal. She escalated her opiate use to the point that she was snorting oxycodone.  She started sebutex last year after she became pregnant.  She has been doing well.  She is going to counseling and home a wheezing on a.  She has not given me proof..  Patient sees psych NP for Xanax.  She has a 5 month old and a 16 year old child.    She suffers with opioid induced constipation.  Amitiza has been helpful.    Medication Refill   Pertinent negatives include no abdominal pain, chest pain, chills, coughing, fatigue, fever, joint swelling, numbness, rash or sore throat.   Follow-up   Pertinent negatives include no abdominal pain, chest pain, chills, coughing, fatigue, fever, joint swelling, numbness, rash or sore throat.   Back Pain   Pertinent negatives include no abdominal pain, chest pain, fever or numbness.     Review of Systems   Constitutional: Negative for activity change, chills, fatigue, fever and unexpected weight change.   HENT: Negative for sore throat and trouble swallowing.    Respiratory: Negative for cough, chest tightness and shortness of breath.    Cardiovascular: Negative for chest pain and leg  swelling.   Gastrointestinal: Positive for constipation. Negative for abdominal pain.   Endocrine: Negative for cold intolerance and heat intolerance.   Genitourinary: Negative for difficulty urinating.   Musculoskeletal: Positive for back pain. Negative for joint swelling.   Skin: Negative for rash.   Neurological: Negative for numbness.   Hematological: Negative for adenopathy.   Psychiatric/Behavioral: Negative for decreased concentration.       Objective:      Vitals:    12/11/19 1525   BP: 132/60   Pulse: (!) 112   Resp: 16     Physical Exam   Constitutional: She is oriented to person, place, and time. She appears well-developed and well-nourished. She appears distressed.   She is an obvious opioid withdrawal   Cardiovascular: Regular rhythm, S1 normal, S2 normal, normal heart sounds and intact distal pulses. Tachycardia present. Exam reveals no gallop and no friction rub.   No murmur heard.  Pulmonary/Chest: Effort normal and breath sounds normal.   Neurological: She is alert and oriented to person, place, and time. No cranial nerve deficit.   Skin: She is diaphoretic.   Psychiatric: Her speech is normal and behavior is normal. Judgment and thought content normal. Cognition and memory are normal. She exhibits a depressed mood.         Assessment:       1. Opioid use disorder, severe, dependence        Plan:   Paulette was seen today for consult.    Diagnoses and all orders for this visit:    Recent THC and Meth use    Opioid use disorder, severe, dependence  -     buprenorphine-naloxone (SUBOXONE) 8-2 mg Film; Place 2 packets (2 each total) under the tongue once daily. THALIA # (Suboxone) [id number]  Dispense: 60 each; Refill: 0    1.  Restart buprenorphine 8 mg Film; Place 2 packets (2 each total) under the tongue once daily.   2.  Patient is encouraged to continue [facility] T[facility].  3.  Patient will return to clinic in one month  4.  A urinary drug screen will be done as needed.    5.  Patient encouraged to avoid  places and friends that are associated with past drug  use.  6.  15 minutes was spent with patient.  At that time was counseling the patient about dependence, anxiety issues, and relationships    Constipation due to opioid therapy  AMITIZA 24 MCG Cap; Take 1 capsule (24 mcg total) by mouth 2 (two) times daily with meals.  Increase fiber  Continue Miralax, colace    RTC in 3 weeks for urine drug screen.  Must make this appointment.

## 2020-08-21 DIAGNOSIS — Z11.59 NEED FOR HEPATITIS C SCREENING TEST: ICD-10-CM

## 2020-11-07 ENCOUNTER — PATIENT MESSAGE (OUTPATIENT)
Dept: FAMILY MEDICINE | Facility: CLINIC | Age: 35
End: 2020-11-07

## 2020-11-10 ENCOUNTER — PATIENT MESSAGE (OUTPATIENT)
Dept: FAMILY MEDICINE | Facility: CLINIC | Age: 35
End: 2020-11-10

## 2021-01-04 ENCOUNTER — PATIENT MESSAGE (OUTPATIENT)
Dept: ADMINISTRATIVE | Facility: HOSPITAL | Age: 36
End: 2021-01-04

## 2021-04-05 ENCOUNTER — PATIENT MESSAGE (OUTPATIENT)
Dept: ADMINISTRATIVE | Facility: HOSPITAL | Age: 36
End: 2021-04-05

## 2021-05-06 ENCOUNTER — PATIENT MESSAGE (OUTPATIENT)
Dept: RESEARCH | Facility: HOSPITAL | Age: 36
End: 2021-05-06

## 2021-07-06 ENCOUNTER — PATIENT MESSAGE (OUTPATIENT)
Dept: ADMINISTRATIVE | Facility: HOSPITAL | Age: 36
End: 2021-07-06

## 2022-02-10 ENCOUNTER — PATIENT MESSAGE (OUTPATIENT)
Dept: ADMINISTRATIVE | Facility: HOSPITAL | Age: 37
End: 2022-02-10
Payer: MEDICAID

## 2022-04-04 ENCOUNTER — PATIENT MESSAGE (OUTPATIENT)
Dept: ADMINISTRATIVE | Facility: HOSPITAL | Age: 37
End: 2022-04-04
Payer: MEDICAID

## 2022-04-04 ENCOUNTER — PATIENT MESSAGE (OUTPATIENT)
Dept: ADMINISTRATIVE | Facility: OTHER | Age: 37
End: 2022-04-04
Payer: MEDICAID

## 2023-03-20 ENCOUNTER — TELEPHONE (OUTPATIENT)
Dept: PSYCHIATRY | Facility: CLINIC | Age: 38
End: 2023-03-20

## 2023-03-20 NOTE — TELEPHONE ENCOUNTER
----- Message from Linnea Douglas sent at 3/20/2023 12:53 PM CDT -----  Contact: self  Pualette Bryant  MRN: [id number]  : 1985  PCP: García Angelo  Home Phone      [phone]  Work Phone      Not on file.  Mobile          [phone]  [employer]          [phone]      MESSAGE: Please re/s her appt she have for  @ 10:00    Phone [phone]